# Patient Record
Sex: MALE | Race: WHITE | NOT HISPANIC OR LATINO | URBAN - NONMETROPOLITAN AREA
[De-identification: names, ages, dates, MRNs, and addresses within clinical notes are randomized per-mention and may not be internally consistent; named-entity substitution may affect disease eponyms.]

---

## 2024-05-07 ENCOUNTER — OFFICE (OUTPATIENT)
Dept: URBAN - NONMETROPOLITAN AREA CLINIC 1 | Facility: CLINIC | Age: 74
End: 2024-05-07
Payer: COMMERCIAL

## 2024-05-07 VITALS
HEART RATE: 57 BPM | WEIGHT: 219 LBS | DIASTOLIC BLOOD PRESSURE: 85 MMHG | SYSTOLIC BLOOD PRESSURE: 144 MMHG | HEIGHT: 72 IN

## 2024-05-07 DIAGNOSIS — I10 ESSENTIAL (PRIMARY) HYPERTENSION: ICD-10-CM

## 2024-05-07 DIAGNOSIS — Z86.010 PERSONAL HISTORY OF COLONIC POLYPS: ICD-10-CM

## 2024-05-07 PROCEDURE — 99204 OFFICE O/P NEW MOD 45 MIN: CPT | Performed by: NURSE PRACTITIONER

## 2024-05-07 NOTE — SERVICEHPINOTES
Patient with a history of GERD, HTN, HLD, colon polyps, and thyroid disease presents to the clinic today for colonoscopy consult.  His last colonoscopy was in 2013 by Dr. Fontenot that revealed tubular adenoma transverse colon polyp and hyperplastic polyp.  He is unsure about family history of crc as he is adopted.  He has a good appetite and has 1 BM daily, denies any straining with defecation.  Denies any melena, n/v/d, hematochezia, unintentional weight loss, abdominal pain, change in BM, or fever.  He has chronic gerd controlled well with Omeprazole 40mg po qd for many years.  Denies current cigarette use.  Denies upper gi complaints or dysphagia.  Denies cardiac stents, anticoagulation therapy, supplemental oxygen use, ckd.  br
br   Colonoscopy by Dr. Fontenot 3/2013
brDIAGNOSIS:br MICROSCOPIC:  br  1. MID TRANSVERSE COLON POLYP:br   Tubular adenoma. br  2. COLON POLYP, 18 CM:br   Hyperplastic polyp.

## 2024-05-07 NOTE — SERVICENOTES
Risks of procedure explained to patient and he wishes to proceed
Bowel prep prescribed and instructions given to patient
Colonoscopy for polyp surveillance
Increase fiber and water intake daily
Avoid Nsaids
HTN-pcp managing.